# Patient Record
Sex: FEMALE | Race: WHITE | Employment: PART TIME | URBAN - METROPOLITAN AREA
[De-identification: names, ages, dates, MRNs, and addresses within clinical notes are randomized per-mention and may not be internally consistent; named-entity substitution may affect disease eponyms.]

---

## 2018-11-20 ENCOUNTER — OFFICE VISIT (OUTPATIENT)
Dept: FAMILY MEDICINE CLINIC | Age: 53
End: 2018-11-20

## 2018-11-20 VITALS
SYSTOLIC BLOOD PRESSURE: 117 MMHG | WEIGHT: 157 LBS | HEART RATE: 70 BPM | RESPIRATION RATE: 14 BRPM | HEIGHT: 65 IN | BODY MASS INDEX: 26.16 KG/M2 | DIASTOLIC BLOOD PRESSURE: 63 MMHG | OXYGEN SATURATION: 95 % | TEMPERATURE: 98.4 F

## 2018-11-20 DIAGNOSIS — R25.2 LEG CRAMPS: ICD-10-CM

## 2018-11-20 DIAGNOSIS — M25.551 RIGHT HIP PAIN: Primary | ICD-10-CM

## 2018-11-20 RX ORDER — METHIMAZOLE 10 MG/1
TABLET ORAL
Qty: 90 TAB | Refills: 12
Start: 2018-11-20 | End: 2021-01-25 | Stop reason: ALTCHOICE

## 2018-11-20 NOTE — PROGRESS NOTES
HISTORY OF PRESENT ILLNESS Dre Newton is a 47 y.o. female. HPI In for physical. Has been having R hip pain, worse at night, for the last 5-6 months. Not that painful when walks, except for the first few steps. Not taking any meds for it. The last week or 2, has been doing better. Pain seems to be in R groin area. Also has a bump on back that wants to have checked. Has also had leg cramps at night for the last few months. Review of Systems Constitutional: Positive for weight loss. Has been dieting. Respiratory: Negative for cough and shortness of breath. Cardiovascular: Negative for chest pain and orthopnea. Not exercising that much. Gastrointestinal: Negative for abdominal pain and blood in stool. Genitourinary: Negative for dysuria and hematuria. Musculoskeletal:  
     R hip pain Skin: Negative for itching and rash. Neurological: Negative for focal weakness and loss of consciousness. Endo/Heme/Allergies: Negative for polydipsia. Does not bruise/bleed easily. Psychiatric/Behavioral: Negative for depression. The patient does not have insomnia. Physical Exam  
Constitutional: She appears well-developed and well-nourished. HENT:  
Right Ear: External ear normal.  
Left Ear: External ear normal.  
Mouth/Throat: Oropharynx is clear and moist.  
Neck: No thyromegaly present. Cardiovascular: Normal rate, regular rhythm, normal heart sounds and intact distal pulses. Pulmonary/Chest: Breath sounds normal. She has no wheezes. Abdominal: Soft. Bowel sounds are normal. She exhibits no distension and no mass. There is no tenderness. Musculoskeletal: She exhibits no edema. R hip- full rom , no tenderness Lymphadenopathy:  
  She has no cervical adenopathy. Nursing note and vitals reviewed. ASSESSMENT and PLAN Orders Placed This Encounter  XR HIP RT W OR WO PELV 2-3 VWS Standing Status:   Future Standing Expiration Date:   2/20/2019 Order Specific Question:   Reason for Exam  
  Answer:   r hip pain Order Specific Question:   Is Patient Allergic to Contrast Dye? Answer:   No  
 MAGNESIUM  
 methIMAzole (TAPAZOLE) 10 mg tablet Si/2 tab po 3 days a week. Dispense:  90 Tab Refill:  12 Orders Placed This Encounter  XR HIP RT W OR WO PELV 2-3 VWS  
 MAGNESIUM  
 methIMAzole (TAPAZOLE) 10 mg tablet Diagnoses and all orders for this visit: 1. Right hip pain -     XR HIP RT W OR WO PELV 2-3 VWS; Future 2. Leg cramps -     MAGNESIUM Other orders 
-     methIMAzole (TAPAZOLE) 10 mg tablet; 1/2 tab po 3 days a week.

## 2018-11-20 NOTE — PROGRESS NOTES
Chief Complaint Patient presents with  Complete Physical  
 
1. Have you been to the ER, urgent care clinic since your last visit? Hospitalized since your last visit? No 
 
 
2. Have you seen or consulted any other health care providers outside of the [facility] since your last visit? Include any pap smears or colon screening. No  
 
Health Maintenance Due Topic Date Due  
 PAP AKA CERVICAL CYTOLOGY  11/15/1987  Shingrix Vaccine Age 50> (1 of 2) 11/15/2016  BREAST CANCER SCRN MAMMOGRAM  11/15/2016  FOBT Q 1 YEAR AGE 50-75  11/15/2016  Influenza Age 5 to Adult  08/01/2018

## 2018-11-21 LAB — MAGNESIUM SERPL-MCNC: 2 MG/DL (ref 1.6–2.3)

## 2019-06-07 LAB — HBA1C MFR BLD HPLC: 5.4 %

## 2019-11-13 LAB — HBA1C MFR BLD HPLC: 5.3 %

## 2020-05-14 LAB — HBA1C MFR BLD HPLC: 5.5 %

## 2021-01-22 ENCOUNTER — TELEPHONE (OUTPATIENT)
Dept: FAMILY MEDICINE CLINIC | Age: 56
End: 2021-01-22

## 2021-01-22 NOTE — TELEPHONE ENCOUNTER
Patient has a question on a covid vaccine questionnaire. She is having the vaccine next week, and does not know how to answer question. Please call @[phone].

## 2021-01-22 NOTE — TELEPHONE ENCOUNTER
Patient has questions as to whether she should get Covid Vaccine. Made her an appointment as we have not seen patient in 2 years.

## 2021-01-25 ENCOUNTER — OFFICE VISIT (OUTPATIENT)
Dept: FAMILY MEDICINE CLINIC | Age: 56
End: 2021-01-25
Payer: COMMERCIAL

## 2021-01-25 VITALS
TEMPERATURE: 96.9 F | HEART RATE: 77 BPM | OXYGEN SATURATION: 100 % | BODY MASS INDEX: 25.56 KG/M2 | SYSTOLIC BLOOD PRESSURE: 122 MMHG | RESPIRATION RATE: 16 BRPM | DIASTOLIC BLOOD PRESSURE: 77 MMHG | WEIGHT: 153.4 LBS | HEIGHT: 65 IN

## 2021-01-25 DIAGNOSIS — F90.0 ATTENTION DEFICIT HYPERACTIVITY DISORDER (ADHD), PREDOMINANTLY INATTENTIVE TYPE: ICD-10-CM

## 2021-01-25 DIAGNOSIS — Z12.11 COLON CANCER SCREENING: Primary | ICD-10-CM

## 2021-01-25 PROCEDURE — 99213 OFFICE O/P EST LOW 20 MIN: CPT | Performed by: FAMILY MEDICINE

## 2021-01-25 RX ORDER — DEXTROAMPHETAMINE SACCHARATE, AMPHETAMINE ASPARTATE MONOHYDRATE, DEXTROAMPHETAMINE SULFATE AND AMPHETAMINE SULFATE 2.5; 2.5; 2.5; 2.5 MG/1; MG/1; MG/1; MG/1
10 CAPSULE, EXTENDED RELEASE ORAL
Qty: 30 CAP | Refills: 0 | Status: SHIPPED | OUTPATIENT
Start: 2021-01-25 | End: 2021-03-02 | Stop reason: SDUPTHER

## 2021-01-25 NOTE — PROGRESS NOTES
Chief Complaint   Patient presents with    Medication Refill     1. Have you been to the ER, urgent care clinic since your last visit? Hospitalized since your last visit? No    2. Have you seen or consulted any other health care providers outside of the [address] [place] [address] since your last visit? Include any pap smears or colon screening. endocrinology     Patient here for follow up and form for covid vaccine.

## 2021-01-25 NOTE — PROGRESS NOTES
HISTORY OF PRESENT ILLNESS  Francisco Jamil is a 48 y.o. female. HPI In for checkup. Says that has been losing her focus on things, daughter has ADD. Pt somewhat opposed to meds. thinks that has had it all her life. Says that isnt getting tasks completed. Needs mammogram, pt wants to schedule her own appt. Sees gyn for paps tests. Declines flu shots. ROS    Physical Exam  Vitals signs and nursing note reviewed. Constitutional:       Appearance: She is well-developed. HENT:      Right Ear: External ear normal.      Left Ear: External ear normal.   Neck:      Thyroid: No thyromegaly. Cardiovascular:      Rate and Rhythm: Normal rate and regular rhythm. Heart sounds: Normal heart sounds. Pulmonary:      Breath sounds: Normal breath sounds. No wheezing. Abdominal:      General: Bowel sounds are normal. There is no distension. Palpations: Abdomen is soft. There is no mass. Tenderness: There is no abdominal tenderness. Lymphadenopathy:      Cervical: No cervical adenopathy. ASSESSMENT and PLAN  Orders Placed This Encounter    COLOGUARD TEST (FECAL DNA COLORECTAL CANCER SCREENING)    amphetamine-dextroamphetamine XR (ADDERALL XR) 10 mg XR capsule     Diagnoses and all orders for this visit:    1. Colon cancer screening  -     COLOGUARD TEST (FECAL DNA COLORECTAL CANCER SCREENING)    2. Attention deficit hyperactivity disorder (ADHD), predominantly inattentive type  -     amphetamine-dextroamphetamine XR (ADDERALL XR) 10 mg XR capsule; Take 1 Cap by mouth every morning. Max Daily Amount: 10 mg. Follow-up and Dispositions    · Return in about 3 months (around 4/25/2021).

## 2021-02-04 ENCOUNTER — TELEPHONE (OUTPATIENT)
Dept: FAMILY MEDICINE CLINIC | Age: 56
End: 2021-02-04

## 2021-02-04 NOTE — TELEPHONE ENCOUNTER
Patient is calling regarding a medication. She is supposed to call weekly. rxamphetamine-dextroamphetamine XR (ADDERALL XR) 10 mg XR capsule. Please call @[phone].   She is available after 2:30 p.m.      :

## 2021-02-04 NOTE — TELEPHONE ENCOUNTER
Patient advised to call 2-3 days before medication refill is needed, she states it is helping with focus issues

## 2021-03-02 DIAGNOSIS — F90.0 ATTENTION DEFICIT HYPERACTIVITY DISORDER (ADHD), PREDOMINANTLY INATTENTIVE TYPE: ICD-10-CM

## 2021-03-02 RX ORDER — DEXTROAMPHETAMINE SACCHARATE, AMPHETAMINE ASPARTATE MONOHYDRATE, DEXTROAMPHETAMINE SULFATE AND AMPHETAMINE SULFATE 2.5; 2.5; 2.5; 2.5 MG/1; MG/1; MG/1; MG/1
10 CAPSULE, EXTENDED RELEASE ORAL
Qty: 30 CAP | Refills: 0 | Status: SHIPPED | OUTPATIENT
Start: 2021-03-02 | End: 2021-04-13 | Stop reason: SDUPTHER

## 2021-03-02 NOTE — TELEPHONE ENCOUNTER
Patient wants to get the medication amphetamine-dextroamphetamine XR (ADDERALL XR) 10 mg XR capsule.   If any questions please give her a call @ [phone]

## 2021-03-05 LAB — COLOGUARD TEST, EXTERNAL: NEGATIVE

## 2021-04-13 DIAGNOSIS — F90.0 ATTENTION DEFICIT HYPERACTIVITY DISORDER (ADHD), PREDOMINANTLY INATTENTIVE TYPE: ICD-10-CM

## 2021-04-13 RX ORDER — DEXTROAMPHETAMINE SACCHARATE, AMPHETAMINE ASPARTATE MONOHYDRATE, DEXTROAMPHETAMINE SULFATE AND AMPHETAMINE SULFATE 2.5; 2.5; 2.5; 2.5 MG/1; MG/1; MG/1; MG/1
10 CAPSULE, EXTENDED RELEASE ORAL
Qty: 30 CAP | Refills: 0 | Status: SHIPPED | OUTPATIENT
Start: 2021-04-13 | End: 2021-06-01 | Stop reason: SDUPTHER

## 2021-04-13 NOTE — TELEPHONE ENCOUNTER
Patient is requesting a RX refill     amphetamine-dextroamphetamine XR (ADDERALL XR) 10 mg XR capsule she can be reached @ [phone]

## 2021-04-26 ENCOUNTER — OFFICE VISIT (OUTPATIENT)
Dept: FAMILY MEDICINE CLINIC | Age: 56
End: 2021-04-26
Payer: COMMERCIAL

## 2021-04-26 VITALS
BODY MASS INDEX: 25.49 KG/M2 | HEIGHT: 65 IN | HEART RATE: 67 BPM | RESPIRATION RATE: 16 BRPM | WEIGHT: 153 LBS | SYSTOLIC BLOOD PRESSURE: 113 MMHG | OXYGEN SATURATION: 100 % | DIASTOLIC BLOOD PRESSURE: 62 MMHG | TEMPERATURE: 97.1 F

## 2021-04-26 DIAGNOSIS — F90.2 ATTENTION DEFICIT HYPERACTIVITY DISORDER (ADHD), COMBINED TYPE: Primary | ICD-10-CM

## 2021-04-26 PROCEDURE — 99213 OFFICE O/P EST LOW 20 MIN: CPT | Performed by: FAMILY MEDICINE

## 2021-04-26 NOTE — PROGRESS NOTES
HISTORY OF PRESENT ILLNESS  Melisa Stewart is a 48 y.o. female. HPI in for followup of adl. Has been taking adderall about 4 days a week, the days she goes in for work. Some mild insomnia. SEes Dr. Rena Santo for hyperthyroidism, is off all meds     ROS    Physical Exam  Vitals signs and nursing note reviewed. Constitutional:       Appearance: She is well-developed. HENT:      Right Ear: External ear normal.      Left Ear: External ear normal.   Neck:      Thyroid: No thyromegaly. Cardiovascular:      Rate and Rhythm: Normal rate and regular rhythm. Heart sounds: Normal heart sounds. Pulmonary:      Breath sounds: Normal breath sounds. No wheezing. Abdominal:      General: Bowel sounds are normal. There is no distension. Palpations: Abdomen is soft. There is no mass. Tenderness: There is no abdominal tenderness. Lymphadenopathy:      Cervical: No cervical adenopathy. ASSESSMENT and PLAN  No orders of the defined types were placed in this encounter. Diagnoses and all orders for this visit:    1. Attention deficit hyperactivity disorder (ADHD), combined type      Follow-up and Dispositions    · Return in about 6 months (around 10/26/2021).

## 2021-04-26 NOTE — LETTER
CONTROLLED SUBSTANCE MEDICATION AGREEMENT Patient Name: Kat Livingston Patient YOB: 1965 I understand, that controlled substance medications may be used to help better manage my symptoms and to improve my ability to function at home, work and in social settings. However, I also understand that these medications do have risks, which have been discussed with me, including possible development of physical or psychological dependence. I understand that successful treatment requires mutual trust and honesty between me and my provider. I understand and agree that following this Medication Agreement is necessary in continuing my provider-patient relationship and the success of my treatment plan. Explanation from my Provider: Benefits and Goals of Controlled Substance Medications: There are two potential goals for your treatment: (1) decreased pain and suffering (2) improved daily life functions. There are many possible treatments for your chronic condition(s). Alternatives such as physical therapy, yoga, massage, home daily exercise, meditation, relaxation techniques, injections, chiropractic manipulations, surgery, cognitive therapy, hypnosis and many medications that are not habit-forming may be used. Use of controlled substance medications may be helpful, but they are unlikely to resolve all symptoms or restore all function. Explanation from my Provider: Risks of Controlled Substance Medications: 
 Opioid pain medications: These medications can lead to problems such as addiction/dependence, sedation, lightheadedness/dizziness, memory issues, falls, constipation, nausea, or vomiting. They may also impair the ability to drive or operate machinery. Additionally, these medications may lower testosterone levels, leading to loss of bone strength, stamina and sex drive.   They may cause problems with breathing, sleep apnea and reduced coughing, which is especially dangerous for patients with lung disease. Overdose or dangerous interactions with alcohol and other medications may occur, leading to death. Hyperalgesia may develop, which means patients receiving opioids for the treatment of pain may become more sensitive to certain painful stimuli, and in some cases, experience pain from ordinarily non-painful stimuli. Women between the ages of 14-53 who could become pregnant should carefully weigh the risks and benefits of opioids with their physicians, as these medications increase the risk of pregnancy complications, including miscarriage,  delivery and stillbirth. It is also possible for babies to be born addicted to opioids. Opioid dependence withdrawal symptoms may include; feelings of uneasiness, increased pain, irritability, belly pain, diarrhea, sweats and goose-flesh. Testosterone replacement therapy:  Potential side effects include increased risk of stroke and heart attack, blood clots, increased blood pressure, increased cholesterol, enlarged prostate, sleep apnea, irritability/aggression and other mood disorders, and decreased fertility. Vitaliy Ballesteros (1965)             Page 1 of 4    Initials:_______ Benzodiazepines and non-benzodiazepine sleep medications: These medications can lead to problems such as addiction/dependence, sedation, fatigue, lightheadedness, dizziness, incoordination, falls, depression, hallucinations, and impaired judgment, memory and concentration. The ability to drive and operate machinery may also be affected. Abnormal sleep-related behaviors have been reported, including sleepwalking, driving, making telephone calls, eating, or having sex while not fully awake. These medications can suppress breathing and worsen sleep apnea, particularly when combined with alcohol or other sedating medications, potentially leading to death. Dependence withdrawal symptoms may include tremors, anxiety, hallucinations and seizures.  
 Stimulants:  Common adverse effects include addiction/dependence, increased blood pressure and heart rate, decreased appetite, nausea, involuntary weight loss, insomnia,  irritability, and headaches. These risks may increase when these medications are combined with other stimulants, such as caffeine pills or energy drinks, certain weight loss supplements and oral decongestants. Dependence withdrawal symptoms may include depressed mood, loss of interest, suicidal thoughts, anxiety, fatigue, appetite changes and agitation. I agree and understand that I and my prescriber have the following rights and responsibilities regarding my treatment plan:  
1. MY RIGHTS: 
To be informed of my treatment and medication plan. To be an active participant in my health and wellbeing. 2. MY RESPONSIBILITY AND UNDERSTANDING FOR USE OF MEDICATIONS  I will take medications at the dose and frequency as directed. For my safety, I will not increase or change how I take my medications without the recommendation of my healthcare provider.  I will actively participate in any program recommended by my provider which may improve function, including social, physical, psychological programs.  I will not take my medications with alcohol or other drugs not prescribed to me. I understand that drinking alcohol with my medications increases the chances of side effects, including reduced breathing rate and could lead to personal injury when operating machinery.  I understand that if I have a history of substance use disorders, including alcohol or other illicit drugs, that I may be at increased risk of addiction to my medications.  I agree to notify my provider immediately if I should become pregnant so that my treatment plan can be adjusted.  
 I agree and understand that I shall only receive controlled substance medications from the prescriber that signed this agreement unless there is written agreement among other prescribers of controlled substances outlining the responsibility of the medications being prescribed.  I understand that the if the controlled medication is not helping to achieve goals, the dosage may be tapered and no longer prescribed. 3. MY RESPONSIBILITY FOR COMMUNICATION / PRESCRIPTION RENEWALS 
 I agree that all controlled substance medications that I take will be prescribed only by my provider. If another healthcare provider prescribes me medication in an emergency, I will notify my provider within seventy-two (72) hours. Carissa Rosales (1965)             Page 2 of 4    Initials:_______  I will arrange for refills at the prescribed interval ONLY during regular office hours. I will not ask for refills earlier than agreed, after-hours, on holidays or weekends. Refills may take up to 72 hours for processing and prescriptions to reach the pharmacy.  I will inform my other health care providers that I am taking these medications and of the existence of this [id number]. In the event of an emergency, I will provide the same information to the emergency department prescribers.  I will keep my provider updated on the pharmacy I am using for controlled medication prescription filling. 4. MY RESPONSIBILITY FOR PROTECTING MEDICATIONS  I will protect my prescriptions and medications. I understand that lost or misplaced prescriptions will not be replaced.  I will keep medications only for my own use and will not share them with others. I will keep all medications away from children.  I agree that if my medications are adjusted or discontinued, I will properly dispose of any remaining medications. I understand that I will be required to dispose of any remaining controlled medications as, directed by my prescriber, prior to being provided with any prescriptions for other controlled medications.   Medication drop box locations can be found at: [employer] 5. MY RESPONSIBILITY WITH ILLEGAL DRUGS  I will not use illegal or street drugs or another person's prescription medications not prescribed to me.  If there are identified addiction type symptoms, then referral to a program may be provided by my provider and I agree to follow through with this recommendation. 6. MY RESPONSIBILITY FOR COOPERATION WITH INVESTIGATIONS  I understand that my provider will comply with any applicable law and may discuss my use and/or possible misuse/abuse of controlled substances and alcohol, as appropriate, with any health care provider involved in my care, pharmacist, or legal authority.  I authorize my provider and pharmacy to cooperate fully with law enforcement agencies (as permitted by law) in the investigation of any possible misuse, sale, or other diversion of my controlled substances.  I agree to waive any applicable privilege or right of privacy or confidentiality with respect to these authorizations. 7. PROVIDERS RIGHT TO MONITOR FOR SAFETY: PRESCRIPTION MONITORING / DRUG TESTING 
 I consent to drug/toxicology screening and will submit to a drug screen upon my providers request to assure I am only taking the prescribed drugs for my safety monitoring. I understand that a drug screen is a laboratory test in which a sample of my urine, blood or saliva is checked to see what drugs I have been taking. This may entail an observed urine specimen, which means that a nurse or other health care provider may watch me provide urine, and I will cooperate if I am asked to provide an observed specimen. Cheng Botello (1965)             Page 3 of 4    Initials:_______  I understand that my provider will check a copy of my State Prescription Monitoring Program () Report in order to safely prescribe medications.    
 Pill Counts: I consent to pill counts when requested. I may be asked to bring all my prescribed controlled substance medications, in their original bottles, to all of my scheduled appointments. In addition, my provider may ask me to come to the practice at any time for a random pill count. 8. TERMINATION OF THIS AGREEMENT  For my safety, my prescriber has the right to stop prescribing controlled substance medications and may end this agreement.  Conditions that may result in termination of this agreement: 
a. I do not show any improvement in pain, or my activity has not improved. b. I develop rapid tolerance or loss of improvement, as described in my treatment plan. 
c. I develop significant side effects from the medication. d. My behavior is not consistent with the responsibilities outlined above, thereby causing safety concerns to continue prescribing controlled substance medications. e. I fail to follow the terms of this agreement. f. Other:____________________________ UNDERSTANDING THIS MEDICATION AGREEMENT:   
I have read the above and have had all my questions answered. For chronic disease management, I know that my symptoms can be managed with many types of treatments. A chronic medication trial may be part of my treatment, but I must be an active participant in my care. Medication therapy is only one part of my symptom management plan. In some cases, there may be limited scientific evidence to support the chronic use of certain medications to improve symptoms and daily function. Furthermore, in certain circumstances, there may be scientific information that suggests that the use of chronic controlled substances may worsen my symptoms and increase my risk of unintentional death directly related to this medication therapy.   I know that if my provider feels my risk from controlled medications is greater than my benefit, I will have my controlled substance medication(s) compassionately lowered or removed altogether. I further agree to allow this office to contact my HIPAA contact if there are concerns about my safety and use of the controlled medications. I have agreed to use the prescribed controlled substance medications to me as instructed by my provider and as stated in this Medication Agreement. My initial on each page and my signature below shows that I have read each page and I have had the opportunity to ask questions with answers provided by my provider. Patient Name (Printed): _____________________________________ Patient Signature:  ______________________   Date: _____________ Prescriber Name (Printed): ___________________________________ Prescriber Signature: _____________________  Date: _____________ Al Masters (1965)             Page 4 of 4

## 2021-04-26 NOTE — PROGRESS NOTES
Chief Complaint   Patient presents with    Follow-up    Medication Refill     1. Have you been to the ER, urgent care clinic since your last visit? Hospitalized since your last visit? No    2. Have you seen or consulted any other health care providers outside of the [address] [place] [address] since your last visit? Include any pap smears or colon screening.  No

## 2021-05-06 LAB — HBA1C MFR BLD HPLC: 5.5 %

## 2021-06-01 DIAGNOSIS — F90.0 ATTENTION DEFICIT HYPERACTIVITY DISORDER (ADHD), PREDOMINANTLY INATTENTIVE TYPE: ICD-10-CM

## 2021-06-01 RX ORDER — DEXTROAMPHETAMINE SACCHARATE, AMPHETAMINE ASPARTATE MONOHYDRATE, DEXTROAMPHETAMINE SULFATE AND AMPHETAMINE SULFATE 2.5; 2.5; 2.5; 2.5 MG/1; MG/1; MG/1; MG/1
10 CAPSULE, EXTENDED RELEASE ORAL
Qty: 30 CAPSULE | Refills: 0 | Status: SHIPPED | OUTPATIENT
Start: 2021-06-01 | End: 2021-10-26 | Stop reason: DRUGHIGH

## 2021-06-01 NOTE — TELEPHONE ENCOUNTER
Patient is calling, regarding a refill on her:amphetamine-dextroamphetamine XR (ADDERALL XR) 10 mg XR capsule. Please call @[phone].

## 2021-06-01 NOTE — TELEPHONE ENCOUNTER
Last OV: 4/26/21  Next Appt: 10/26/21  Last Refill: 4/13/21    Requested Prescriptions     Pending Prescriptions Disp Refills    amphetamine-dextroamphetamine XR (ADDERALL XR) 10 mg XR capsule 30 Capsule 0     Sig: Take 1 Capsule by mouth every morning. Max Daily Amount: 10 mg.     LM with pt that refill request received and sent to PCP for signature.

## 2021-10-26 ENCOUNTER — OFFICE VISIT (OUTPATIENT)
Dept: FAMILY MEDICINE CLINIC | Age: 56
End: 2021-10-26
Payer: COMMERCIAL

## 2021-10-26 VITALS
HEIGHT: 65 IN | OXYGEN SATURATION: 98 % | DIASTOLIC BLOOD PRESSURE: 63 MMHG | SYSTOLIC BLOOD PRESSURE: 126 MMHG | BODY MASS INDEX: 24.99 KG/M2 | HEART RATE: 71 BPM | TEMPERATURE: 97.5 F | RESPIRATION RATE: 16 BRPM | WEIGHT: 150 LBS

## 2021-10-26 DIAGNOSIS — F90.0 ATTENTION DEFICIT HYPERACTIVITY DISORDER (ADHD), PREDOMINANTLY INATTENTIVE TYPE: Primary | ICD-10-CM

## 2021-10-26 PROCEDURE — 99213 OFFICE O/P EST LOW 20 MIN: CPT | Performed by: FAMILY MEDICINE

## 2021-10-26 RX ORDER — DEXTROAMPHETAMINE SACCHARATE, AMPHETAMINE ASPARTATE MONOHYDRATE, DEXTROAMPHETAMINE SULFATE AND AMPHETAMINE SULFATE 3.75; 3.75; 3.75; 3.75 MG/1; MG/1; MG/1; MG/1
15 CAPSULE, EXTENDED RELEASE ORAL
Qty: 30 CAPSULE | Refills: 0 | Status: SHIPPED | OUTPATIENT
Start: 2021-10-26 | End: 2022-03-14 | Stop reason: ALTCHOICE

## 2021-10-26 NOTE — PROGRESS NOTES
Chief Complaint   Patient presents with    Thyroid Problem    Follow-up     1. Have you been to the ER, urgent care clinic since your last visit? Hospitalized since your last visit? No    2. Have you seen or consulted any other health care providers outside of the [address] [place] [address] since your last visit? Include any pap smears or colon screening.  Yes Endocrinology

## 2021-10-26 NOTE — PROGRESS NOTES
HISTORY OF PRESENT ILLNESS  Mainor Officer is a 48 y.o. female. HPIHas been sitting in virtual meetings at work, wasn't focusing on things. Tried adderall 10 mg- thinks that it helped her focus somewhat. Pt not sure whether to not take it or to take a higher dose. Pt works as a teacher. Says that focus was doing better when was back in school, but now is starting to drift. ROS    Physical Exam  Vitals and nursing note reviewed. Constitutional:       Appearance: She is well-developed. HENT:      Right Ear: External ear normal.      Left Ear: External ear normal.   Neck:      Thyroid: No thyromegaly. Cardiovascular:      Rate and Rhythm: Normal rate and regular rhythm. Heart sounds: Normal heart sounds. Pulmonary:      Breath sounds: Normal breath sounds. No wheezing. Abdominal:      General: Bowel sounds are normal. There is no distension. Palpations: Abdomen is soft. There is no mass. Tenderness: There is no abdominal tenderness. Lymphadenopathy:      Cervical: No cervical adenopathy. ASSESSMENT and PLAN  Orders Placed This Encounter    amphetamine-dextroamphetamine XR (ADDERALL XR) 15 mg XR capsule     Diagnoses and all orders for this visit:    1. Attention deficit hyperactivity disorder (ADHD), predominantly inattentive type  -     amphetamine-dextroamphetamine XR (ADDERALL XR) 15 mg XR capsule; Take 1 Capsule by mouth every morning. Max Daily Amount: 15 mg.

## 2022-01-18 LAB
CREATININE, EXTERNAL: 0.6
HBA1C MFR BLD HPLC: 5.7 %
LDL-C, EXTERNAL: 197

## 2022-02-07 ENCOUNTER — TELEPHONE (OUTPATIENT)
Dept: FAMILY MEDICINE CLINIC | Age: 57
End: 2022-02-07

## 2022-02-07 DIAGNOSIS — F90.0 ATTENTION DEFICIT HYPERACTIVITY DISORDER (ADHD), PREDOMINANTLY INATTENTIVE TYPE: ICD-10-CM

## 2022-02-07 RX ORDER — DEXTROAMPHETAMINE SACCHARATE, AMPHETAMINE ASPARTATE MONOHYDRATE, DEXTROAMPHETAMINE SULFATE AND AMPHETAMINE SULFATE 3.75; 3.75; 3.75; 3.75 MG/1; MG/1; MG/1; MG/1
15 CAPSULE, EXTENDED RELEASE ORAL
Qty: 30 CAPSULE | Refills: 0 | Status: CANCELLED | OUTPATIENT
Start: 2022-02-07

## 2022-02-07 NOTE — TELEPHONE ENCOUNTER
----- Message from Olivia Moreira sent at 2/7/2022  2:48 PM EST -----  Subject: Refill Request    QUESTIONS  Name of Medication? amphetamine-dextroamphetamine XR (ADDERALL XR) 15 mg   XR capsule  Patient-reported dosage and instructions? as needed  How many days do you have left? 0  Preferred Pharmacy? [id number]  Pharmacy phone number (if available)? [phone]  Additional Information for Provider? Requesting if the non extended   release can be sent in, she is advising the extended release seems to   \"keep her up at night. \"  ---------------------------------------------------------------------------  --------------  CALL BACK INFO  What is the best way for the office to contact you? OK to leave message on   voicemail  Preferred Call Back Phone Number?  [phone]

## 2022-02-16 ENCOUNTER — TELEPHONE (OUTPATIENT)
Dept: FAMILY MEDICINE CLINIC | Age: 57
End: 2022-02-16

## 2022-02-16 NOTE — TELEPHONE ENCOUNTER
----- Message from Dulce Moser sent at 2/16/2022 10:01 AM EST -----  Subject: Appointment Request    Reason for Call: Routine Existing Condition Follow Up    QUESTIONS  Type of Appointment? Established Patient  Reason for appointment request? Available appointments did not meet   patient need  Additional Information for Provider? PAtient needs a call back to get   scheduled   ---------------------------------------------------------------------------  --------------  CALL BACK INFO  What is the best way for the office to contact you? OK to leave message on   voicemail  Preferred Call Back Phone Number? [phone]  ---------------------------------------------------------------------------  --------------  SCRIPT ANSWERS  Relationship to Patient? Self  Is this follow up request related to routine Diabetes Management? No  Have you been diagnosed with, awaiting test results for, or told that you   are suspected of having COVID-19 (Coronavirus)? (If patient has tested   negative or was tested as a requirement for work, school, or travel and   not based on symptoms, answer no)? No  Within the past 10 days have you developed any of the following symptoms   (answer no if symptoms have been present longer than 10 days or began   more than 10 days ago)? Fever or Chills, Cough, Shortness of breath or   difficulty breathing, Loss of taste or smell, Sore throat, Nasal   congestion, Sneezing or runny nose, Fatigue or generalized body aches   (answer no if pain is specific to a body part e.g. back pain), Diarrhea,   Headache? No  Have you had close contact with someone with COVID-19 in the last 7 days? No  (Service Expert  click yes below to proceed with [employer] As Usual   Scheduling)?  Yes

## 2022-03-14 ENCOUNTER — OFFICE VISIT (OUTPATIENT)
Dept: FAMILY MEDICINE CLINIC | Age: 57
End: 2022-03-14
Payer: COMMERCIAL

## 2022-03-14 VITALS
SYSTOLIC BLOOD PRESSURE: 114 MMHG | RESPIRATION RATE: 16 BRPM | TEMPERATURE: 97 F | WEIGHT: 148.6 LBS | HEART RATE: 70 BPM | OXYGEN SATURATION: 98 % | DIASTOLIC BLOOD PRESSURE: 68 MMHG | BODY MASS INDEX: 24.76 KG/M2 | HEIGHT: 65 IN

## 2022-03-14 DIAGNOSIS — F90.0 ATTENTION DEFICIT HYPERACTIVITY DISORDER (ADHD), PREDOMINANTLY INATTENTIVE TYPE: Primary | ICD-10-CM

## 2022-03-14 PROCEDURE — 99213 OFFICE O/P EST LOW 20 MIN: CPT | Performed by: FAMILY MEDICINE

## 2022-03-14 RX ORDER — TRIAMCINOLONE ACETONIDE 1 MG/G
CREAM TOPICAL 2 TIMES DAILY
Qty: 45 G | Refills: 2 | Status: SHIPPED | OUTPATIENT
Start: 2022-03-14

## 2022-03-14 RX ORDER — DEXTROAMPHETAMINE SACCHARATE, AMPHETAMINE ASPARTATE, DEXTROAMPHETAMINE SULFATE AND AMPHETAMINE SULFATE 3.75; 3.75; 3.75; 3.75 MG/1; MG/1; MG/1; MG/1
15 TABLET ORAL DAILY
Qty: 30 TABLET | Refills: 0 | Status: SHIPPED | OUTPATIENT
Start: 2022-03-14 | End: 2022-04-12

## 2022-03-14 NOTE — PROGRESS NOTES
HISTORY OF PRESENT ILLNESS  Chloe Benito is a 55 y.o. female. HPI in for followup. Says that has difficulty sleeping on the days that she takes her adderall xr. Otherwise doing fairly well. The  adderall does help her focus  At work    ROS    Physical Exam  Vitals and nursing note reviewed. Constitutional:       Appearance: She is well-developed. HENT:      Right Ear: External ear normal.      Left Ear: External ear normal.   Neck:      Thyroid: No thyromegaly. Cardiovascular:      Rate and Rhythm: Normal rate and regular rhythm. Heart sounds: Normal heart sounds. Pulmonary:      Breath sounds: Normal breath sounds. No wheezing. Abdominal:      General: Bowel sounds are normal. There is no distension. Palpations: Abdomen is soft. There is no mass. Tenderness: There is no abdominal tenderness. Lymphadenopathy:      Cervical: No cervical adenopathy. ASSESSMENT and PLAN  Orders Placed This Encounter    dextroamphetamine-amphetamine (ADDERALL) 15 mg tablet    triamcinolone acetonide (KENALOG) 0.1 % topical cream     Diagnoses and all orders for this visit:    1. Attention deficit hyperactivity disorder (ADHD), predominantly inattentive type  -     dextroamphetamine-amphetamine (ADDERALL) 15 mg tablet; Take 1 Tablet by mouth daily for 29 days. Max Daily Amount: 15 mg. Other orders  -     triamcinolone acetonide (KENALOG) 0.1 % topical cream; Apply  to affected area two (2) times a day. use thin layer          Will see if insomnia improves on the immediate release adderall.

## 2022-04-26 DIAGNOSIS — F90.0 ATTENTION DEFICIT HYPERACTIVITY DISORDER (ADHD), PREDOMINANTLY INATTENTIVE TYPE: Primary | ICD-10-CM

## 2022-04-26 RX ORDER — DEXTROAMPHETAMINE SACCHARATE, AMPHETAMINE ASPARTATE, DEXTROAMPHETAMINE SULFATE AND AMPHETAMINE SULFATE 3.75; 3.75; 3.75; 3.75 MG/1; MG/1; MG/1; MG/1
15 TABLET ORAL DAILY
Qty: 29 TABLET | Refills: 0 | Status: SHIPPED | OUTPATIENT
Start: 2022-04-26 | End: 2022-05-25

## 2022-04-26 NOTE — TELEPHONE ENCOUNTER
----- Message from Alberto Cabot sent at 4/26/2022  3:17 PM EDT -----  Subject: Refill Request    QUESTIONS  Name of Medication? dextroamphetamine-amphetamine (ADDERALL) 15 mg tablet  Patient-reported dosage and instructions? 15 MG tablet take as needed  How many days do you have left? 4  Preferred Pharmacy? [id number]  Pharmacy phone number (if available)? [phone]  ---------------------------------------------------------------------------  --------------  CALL BACK INFO  What is the best way for the office to contact you? OK to leave message on   voicemail  Preferred Call Back Phone Number? [phone]  ---------------------------------------------------------------------------  --------------  SCRIPT ANSWERS  Relationship to Patient?  Self

## 2022-07-06 DIAGNOSIS — F90.0 ATTENTION DEFICIT HYPERACTIVITY DISORDER (ADHD), PREDOMINANTLY INATTENTIVE TYPE: Primary | ICD-10-CM

## 2022-07-06 RX ORDER — DEXTROAMPHETAMINE SACCHARATE, AMPHETAMINE ASPARTATE, DEXTROAMPHETAMINE SULFATE AND AMPHETAMINE SULFATE 3.75; 3.75; 3.75; 3.75 MG/1; MG/1; MG/1; MG/1
15 TABLET ORAL DAILY
Qty: 30 TABLET | Refills: 0 | Status: SHIPPED | OUTPATIENT
Start: 2022-07-06 | End: 2022-09-14 | Stop reason: SDUPTHER

## 2022-07-06 RX ORDER — DEXTROAMPHETAMINE SACCHARATE, AMPHETAMINE ASPARTATE, DEXTROAMPHETAMINE SULFATE AND AMPHETAMINE SULFATE 3.75; 3.75; 3.75; 3.75 MG/1; MG/1; MG/1; MG/1
15 TABLET ORAL DAILY
COMMUNITY
End: 2022-07-06 | Stop reason: SDUPTHER

## 2022-07-06 NOTE — TELEPHONE ENCOUNTER
[phone] Pt would like to get a refill Dextroamp-amphetamin 15 mg    [facility] [address] [facility] [place].

## 2022-08-24 LAB — HBA1C MFR BLD HPLC: 5.7 %

## 2022-09-14 ENCOUNTER — OFFICE VISIT (OUTPATIENT)
Dept: FAMILY MEDICINE CLINIC | Age: 57
End: 2022-09-14
Payer: COMMERCIAL

## 2022-09-14 VITALS
WEIGHT: 152 LBS | HEIGHT: 65 IN | RESPIRATION RATE: 16 BRPM | TEMPERATURE: 98 F | OXYGEN SATURATION: 98 % | DIASTOLIC BLOOD PRESSURE: 72 MMHG | SYSTOLIC BLOOD PRESSURE: 119 MMHG | HEART RATE: 81 BPM | BODY MASS INDEX: 25.33 KG/M2

## 2022-09-14 DIAGNOSIS — F90.0 ATTENTION DEFICIT HYPERACTIVITY DISORDER (ADHD), PREDOMINANTLY INATTENTIVE TYPE: ICD-10-CM

## 2022-09-14 PROCEDURE — 99213 OFFICE O/P EST LOW 20 MIN: CPT | Performed by: FAMILY MEDICINE

## 2022-09-14 RX ORDER — DEXTROAMPHETAMINE SACCHARATE, AMPHETAMINE ASPARTATE, DEXTROAMPHETAMINE SULFATE AND AMPHETAMINE SULFATE 3.75; 3.75; 3.75; 3.75 MG/1; MG/1; MG/1; MG/1
15 TABLET ORAL DAILY
Qty: 30 TABLET | Refills: 0 | Status: SHIPPED | OUTPATIENT
Start: 2022-09-14

## 2022-09-14 NOTE — PROGRESS NOTES
HISTORY OF PRESENT ILLNESS  Sari Cantrell is a 55 y.o. female. HPI In for checkup. Has been off of adderall. Has been off this summer. Plans on starting back to school on Monday. Feels like the medicine helped her on school days. Pts children are 16, 22, 27, 28. 3  still at home. No longer has Graves disease. ROS    Physical Exam  Vitals and nursing note reviewed. Constitutional:       Appearance: She is well-developed. HENT:      Right Ear: External ear normal.      Left Ear: External ear normal.   Neck:      Thyroid: No thyromegaly. Cardiovascular:      Rate and Rhythm: Normal rate and regular rhythm. Heart sounds: Normal heart sounds. Pulmonary:      Breath sounds: Normal breath sounds. No wheezing. Abdominal:      General: Bowel sounds are normal. There is no distension. Palpations: Abdomen is soft. There is no mass. Tenderness: There is no abdominal tenderness. Lymphadenopathy:      Cervical: No cervical adenopathy. ASSESSMENT and PLAN  Orders Placed This Encounter    dextroamphetamine-amphetamine (ADDERALL) 15 mg tablet     Diagnoses and all orders for this visit:    1. Attention deficit hyperactivity disorder (ADHD), predominantly inattentive type  -     dextroamphetamine-amphetamine (ADDERALL) 15 mg tablet; Take 1 Tablet by mouth daily. Max Daily Amount: 15 mg.

## 2022-09-14 NOTE — PROGRESS NOTES
Chief Complaint   Patient presents with    Medication Refill       1. \"Have you been to the ER, urgent care clinic since your last visit? Hospitalized since your last visit? \" No    2. \"Have you seen or consulted any other health care providers outside of the [address] [place] [address] since your last visit? \" No     3. For patients aged 39-70: Has the patient had a colonoscopy / FIT/ Cologuard? Yes - no Care Gap present      If the patient is female:    4. For patients aged 41-77: Has the patient had a mammogram within the past 2 years? Yes - no Care Gap present      5. For patients aged 21-65: Has the patient had a pap smear?  Yes - no Care Gap present    Health Maintenance Due   Topic Date Due    Hepatitis C Screening  Never done    Shingrix Vaccine Age 49> (1 of 2) Never done    COVID-19 Vaccine (3 - Booster for Pfizer series) 07/25/2021    Flu Vaccine (1) 09/01/2022

## 2023-01-19 ENCOUNTER — VIRTUAL VISIT (OUTPATIENT)
Dept: FAMILY MEDICINE CLINIC | Age: 58
End: 2023-01-19
Payer: COMMERCIAL

## 2023-01-19 DIAGNOSIS — R50.9 FEVER AND CHILLS: Primary | ICD-10-CM

## 2023-01-19 DIAGNOSIS — B34.9 VIRAL ILLNESS: ICD-10-CM

## 2023-01-19 PROCEDURE — 99213 OFFICE O/P EST LOW 20 MIN: CPT | Performed by: FAMILY MEDICINE

## 2023-01-19 RX ORDER — IBUPROFEN 200 MG
200 TABLET ORAL
COMMUNITY

## 2023-01-19 RX ORDER — DIPHENHYDRAMINE CITRATE AND IBUPROFEN 38; 200 MG/1; MG/1
TABLET, COATED ORAL
COMMUNITY

## 2023-01-19 NOTE — PROGRESS NOTES
Chief Complaint   Patient presents with    Cold Symptoms     Chills, fever, no appetite, cough. Tested covid negative 3 times       1. Have you been to the ER, urgent care clinic since your last visit? Hospitalized since your last visit? No    2. Have you seen or consulted any other health care providers outside of the [address] [place] [address] since your last visit? Include any pap smears or colon screening.  No

## 2023-01-19 NOTE — PROGRESS NOTES
Consent: Beth Xiong, who was seen by synchronous (real-time) audio-video technology, and/or her healthcare decision maker, is aware that this patient-initiated, Telehealth encounter on 1/19/2023 is a billable service, with coverage as determined by her insurance carrier. She is aware that she may receive a bill and has provided verbal consent to proceed: Yes. Beth Xiong is a 65 y.o. female    Pt here for acute care     has a past medical history of Graves disease and Hyperthyroidism. 1 week duration, tired, started having fever 101.5 F recently 1 hr after takind advil, chills X 4 days. Denies nasal issues, HA, sore throat, ear aches, diarrhea, abdo pain, N/V, dysuria, rash. Denies SOB. A little coughing. Took 3 COVID test, last was today and negative  Clothes drench at night. Taking advil 200mg maybe twice a day. Discussed ibu up to 800mg tidprn   Tylenol 650mg tidprn    She works in school    We discussed options for testings. Discussed food, fluids    For now she wanted to wait it out. She may go get a test for UTI and let us know if positive. Reviewed: active problem list, medication list, allergies, notes from last encounter, lab results    A comprehensive review of systems was negative except for that written in the HPI. Assessment & Plan:   Diagnoses and all orders for this visit:    1. Fever and chills    2. Viral illness    Follow-up and Dispositions    Return in about 3 days (around 1/22/2023), or if symptoms worsen or fail to improve. Subjective:   Beth Xiong is a 65 y.o. female who was seen for Cold Symptoms (Chills, fever, no appetite, cough. Tested covid negative 3 times)      Prior to Admission medications    Medication Sig Start Date End Date Taking? Authorizing Provider   ibuprofen (AdviL) 200 mg tablet Take 200 mg by mouth every six (6) hours as needed for Pain.    Yes Provider, Historical   Ibuprofen-diphenhydrAMINE (Advil PM) 200-38 mg tab Take  by mouth. Yes Provider, Historical   dextroamphetamine-amphetamine (ADDERALL) 15 mg tablet Take 1 Tablet by mouth daily. Max Daily Amount: 15 mg. 9/14/22  Yes Holden Galdamez MD   triamcinolone acetonide (KENALOG) 0.1 % topical cream Apply  to affected area two (2) times a day. use thin layer  Patient not taking: No sig reported 3/14/22   Holden Galdamez MD     No Known Allergies      Objective:   Vital Signs: (As obtained by patient/caregiver at home)  There were no vitals taken for this visit. Constitutional: [x] Appears well-developed and well-nourished [x] No apparent distress        Mental status: [x] Alert and awake  [x] Oriented to person/place/time [x] Able to follow commands      Eyes:   EOM    [x]  Normal      Sclera  [x]  Normal              Discharge [x]  None visible       HENT: [x] Normocephalic, atraumatic    [] Mouth/Throat: Mucous membranes are moist    External Ears [x] Normal      Neck: [x] No visualized mass     Pulmonary/Chest: [x] Respiratory effort normal   [x] No visualized signs of difficulty breathing or respiratory distress           Musculoskeletal:   [x] Normal gait with no signs of ataxia         [x] Normal range of motion of neck         Neurological:        [x] No Facial Asymmetry (Cranial nerve 7 motor function) (limited exam due to video visit)          [x] No gaze palsy              Skin:        [x] No significant exanthematous lesions or discoloration noted on facial skin                  Psychiatric:       [x] Normal Affect [] Abnormal -        [x] No Hallucinations      We discussed the expected course, resolution and complications of the diagnosis(es) in detail. Medication risks, benefits, costs, interactions, and alternatives were discussed as indicated. I advised her to contact the office if her condition worsens, changes or fails to improve as anticipated. She expressed understanding with the diagnosis(es) and plan. Patty Primrose is a 65 y.o. female being evaluated by a video visit encounter for concerns as above. A caregiver was present when appropriate. Due to this being a TeleHealth encounter (During SJWTW-46 public health emergency), evaluation of the following organ systems was limited: Vitals/Constitutional/EENT/Resp/CV/GI//MS/Neuro/Skin/Heme-Lymph-Imm. Pursuant to the emergency declaration under the [address] [place] [address], [facility] waiver authority and the [employer] Act, this Virtual  Visit was conducted, with patient's (and/or legal guardian's) consent, to reduce the patient's risk of exposure to COVID-19 and provide necessary medical care. Services were provided through a video synchronous discussion virtually to substitute for in-person clinic visit. Patient and provider were located at their individual homes.         Hermilo Shabazz MD

## 2023-01-20 ENCOUNTER — TELEPHONE (OUTPATIENT)
Dept: FAMILY MEDICINE CLINIC | Age: 58
End: 2023-01-20

## 2023-01-20 NOTE — TELEPHONE ENCOUNTER
Patient would like a call from Nain Robbins regarding her UTI test is positive she can be reached @ [phone]

## 2023-01-20 NOTE — TELEPHONE ENCOUNTER
Spoke with patient. She did a home UTI test today. It was positive. She said that Dr. Demian Roman told her to do test and let him know results. Explained he has left for today. Will return on Monday. Instructed her to go to UC if symptoms get worse over weekend. Patient verbalized understanding.

## 2023-01-23 NOTE — TELEPHONE ENCOUNTER
MD Rachel Levin, LPN  Caller: Unspecified (3 days ago,  2:36 PM)  Done, called pt, updated her chart note to reflect it.      Nicola Nguyen MD   1/23/2023

## 2023-02-01 ENCOUNTER — TELEPHONE (OUTPATIENT)
Dept: FAMILY MEDICINE CLINIC | Age: 58
End: 2023-02-01

## 2023-02-01 NOTE — TELEPHONE ENCOUNTER
Patient states the home strip UTI test is still coming up positive even after taking medication. She requested a call back from the nurse to discuss what can be done. She can be reached at [phone].

## 2023-02-03 ENCOUNTER — OFFICE VISIT (OUTPATIENT)
Dept: FAMILY MEDICINE CLINIC | Age: 58
End: 2023-02-03
Payer: COMMERCIAL

## 2023-02-03 DIAGNOSIS — F90.0 ATTENTION DEFICIT HYPERACTIVITY DISORDER (ADHD), PREDOMINANTLY INATTENTIVE TYPE: ICD-10-CM

## 2023-02-03 DIAGNOSIS — N39.0 URINARY TRACT INFECTION WITHOUT HEMATURIA, SITE UNSPECIFIED: Primary | ICD-10-CM

## 2023-02-03 LAB
BILIRUB UR QL STRIP: NEGATIVE
GLUCOSE UR-MCNC: NEGATIVE MG/DL
KETONES P FAST UR STRIP-MCNC: NEGATIVE MG/DL
PH UR STRIP: 7 [PH] (ref 4.6–8)
PROT UR QL STRIP: NEGATIVE
SP GR UR STRIP: 1.01 (ref 1–1.03)
UA UROBILINOGEN AMB POC: NORMAL (ref 0.2–1)
URINALYSIS CLARITY POC: CLEAR
URINALYSIS COLOR POC: NORMAL
URINE BLOOD POC: NEGATIVE
URINE LEUKOCYTES POC: NORMAL
URINE NITRITES POC: NEGATIVE

## 2023-02-03 RX ORDER — DEXTROAMPHETAMINE SACCHARATE, AMPHETAMINE ASPARTATE, DEXTROAMPHETAMINE SULFATE AND AMPHETAMINE SULFATE 3.75; 3.75; 3.75; 3.75 MG/1; MG/1; MG/1; MG/1
15 TABLET ORAL DAILY
Qty: 30 TABLET | Refills: 0 | Status: SHIPPED | OUTPATIENT
Start: 2023-02-03

## 2023-02-03 NOTE — PROGRESS NOTES
Chief Complaint   Patient presents with    UTI     1. \"Have you been to the ER, urgent care clinic since your last visit? Hospitalized since your last visit? \" Yes Dr. Haven Dawson Visit - 1/19/23 and [facility]  1/21/23- Given Keflex    2. \"Have you seen or consulted any other health care providers outside of the [address] [place] [address] since your last visit? \" No     3. For patients aged 39-70: Has the patient had a colonoscopy / FIT/ Cologuard? Yes - no Care Gap present      If the patient is female:    4. For patients aged 41-77: Has the patient had a mammogram within the past 2 years? Yes - no Care Gap present      5. For patients aged 21-65: Has the patient had a pap smear?  Yes - no Care Gap present    Health Maintenance Due   Topic Date Due    Hepatitis C Screening  Never done    Shingles Vaccine (1 of 2) Never done    COVID-19 Vaccine (3 - Booster for Pfizer series) 04/22/2021    Flu Vaccine (1) 08/01/2022

## 2023-02-03 NOTE — PROGRESS NOTES
HISTORY OF PRESENT ILLNESS  Roxanna Mcclure is a 65 y.o. female. HPI Had had a fever since 1-15,no urgency, frequency. had a virtual visit on 1-19, suggested to have a urine specimen, eventually went to Urgent Care, -1-21, dxed with UTI, put on Cephalexin 500 mg bid, started feeling better after a few pills. Feels better now, finished antibiotics, and did a home urine specimen, which was positive for nitrites. No fever chills. No difficulty voiding, frequency. Drinking more water. No fever, chills at present. NO anorexia, back pains or stomach pains. 3 children are still at home. ROS    Physical Exam  Vitals and nursing note reviewed. Constitutional:       Appearance: She is well-developed. HENT:      Right Ear: External ear normal.      Left Ear: External ear normal.   Neck:      Thyroid: No thyromegaly. Cardiovascular:      Rate and Rhythm: Normal rate and regular rhythm. Heart sounds: Normal heart sounds. Pulmonary:      Breath sounds: Normal breath sounds. No wheezing. Abdominal:      General: Bowel sounds are normal. There is no distension. Palpations: Abdomen is soft. There is no mass. Tenderness: There is no abdominal tenderness. Lymphadenopathy:      Cervical: No cervical adenopathy. ASSESSMENT and PLAN  Orders Placed This Encounter    CULTURE, URINE    AMB POC URINALYSIS DIP STICK MANUAL W/O MICRO    dextroamphetamine-amphetamine (ADDERALL) 15 mg tablet     Diagnoses and all orders for this visit:    1. Urinary tract infection without hematuria, site unspecified  -     AMB POC URINALYSIS DIP STICK MANUAL W/O MICRO  -     CULTURE, URINE; Future    2. Attention deficit hyperactivity disorder (ADHD), predominantly inattentive type  -     dextroamphetamine-amphetamine (ADDERALL) 15 mg tablet; Take 1 Tablet by mouth daily. Max Daily Amount: 15 mg. If urine culture positive , will treat.

## 2023-02-04 LAB
BACTERIA SPEC CULT: NORMAL
SERVICE CMNT-IMP: NORMAL

## 2023-02-07 ENCOUNTER — TELEPHONE (OUTPATIENT)
Dept: FAMILY MEDICINE CLINIC | Age: 58
End: 2023-02-07

## 2023-02-07 NOTE — TELEPHONE ENCOUNTER
Per pharmacy, Amphet/Dextro is on backorder. Pharmacy is requesting an alternative. For Pharmacy Admin Tracking Only    Program: Medication Refill  CPA in place:   Recommendation Provided To:    Intervention Detail: New Rx: 1, reason: Needs Additional Therapy  Intervention Accepted By:   Gap Closed?:   Time Spent (min): 5

## 2023-02-08 NOTE — TELEPHONE ENCOUNTER
Patient notified adderall on back order. Patient may change to different medication if patient would like, if patient wants to change she is to contact the office and let pcp know. Otherwise let pharmacy know to contact her once shipment arrive.

## 2023-03-16 DIAGNOSIS — F90.0 ATTENTION DEFICIT HYPERACTIVITY DISORDER (ADHD), PREDOMINANTLY INATTENTIVE TYPE: ICD-10-CM

## 2023-03-17 RX ORDER — DEXTROAMPHETAMINE SACCHARATE, AMPHETAMINE ASPARTATE, DEXTROAMPHETAMINE SULFATE AND AMPHETAMINE SULFATE 3.75; 3.75; 3.75; 3.75 MG/1; MG/1; MG/1; MG/1
15 TABLET ORAL DAILY
Qty: 30 TABLET | Refills: 0 | Status: SHIPPED | OUTPATIENT
Start: 2023-03-17

## 2023-11-20 ENCOUNTER — NURSE TRIAGE (OUTPATIENT)
Dept: OTHER | Facility: CLINIC | Age: 58
End: 2023-11-20

## 2023-11-20 ENCOUNTER — OFFICE VISIT (OUTPATIENT)
Age: 58
End: 2023-11-20
Payer: COMMERCIAL

## 2023-11-20 VITALS
BODY MASS INDEX: 24.99 KG/M2 | OXYGEN SATURATION: 97 % | SYSTOLIC BLOOD PRESSURE: 125 MMHG | TEMPERATURE: 98 F | DIASTOLIC BLOOD PRESSURE: 60 MMHG | WEIGHT: 150 LBS | HEART RATE: 75 BPM | RESPIRATION RATE: 16 BRPM | HEIGHT: 65 IN

## 2023-11-20 DIAGNOSIS — R05.3 CHRONIC COUGH: Primary | ICD-10-CM

## 2023-11-20 PROCEDURE — G8427 DOCREV CUR MEDS BY ELIG CLIN: HCPCS | Performed by: FAMILY MEDICINE

## 2023-11-20 PROCEDURE — G8420 CALC BMI NORM PARAMETERS: HCPCS | Performed by: FAMILY MEDICINE

## 2023-11-20 PROCEDURE — 99213 OFFICE O/P EST LOW 20 MIN: CPT | Performed by: FAMILY MEDICINE

## 2023-11-20 PROCEDURE — 3017F COLORECTAL CA SCREEN DOC REV: CPT | Performed by: FAMILY MEDICINE

## 2023-11-20 PROCEDURE — G8484 FLU IMMUNIZE NO ADMIN: HCPCS | Performed by: FAMILY MEDICINE

## 2023-11-20 PROCEDURE — 4004F PT TOBACCO SCREEN RCVD TLK: CPT | Performed by: FAMILY MEDICINE

## 2023-11-20 RX ORDER — BENZONATATE 100 MG/1
100 CAPSULE ORAL 3 TIMES DAILY PRN
COMMUNITY
Start: 2023-10-15

## 2023-11-20 RX ORDER — METHYLPREDNISOLONE 4 MG/1
TABLET ORAL
Qty: 1 KIT | Refills: 0 | Status: SHIPPED | OUTPATIENT
Start: 2023-11-20 | End: 2023-11-26

## 2023-11-20 RX ORDER — LEVOFLOXACIN 500 MG/1
500 TABLET, FILM COATED ORAL DAILY
Qty: 7 TABLET | Refills: 0 | Status: SHIPPED | OUTPATIENT
Start: 2023-11-20 | End: 2023-11-27

## 2023-11-20 RX ORDER — DEXTROMETHORPHAN HYDROBROMIDE AND PROMETHAZINE HYDROCHLORIDE 15; 6.25 MG/5ML; MG/5ML
5 SYRUP ORAL 4 TIMES DAILY PRN
Qty: 240 ML | Refills: 1 | Status: SHIPPED | OUTPATIENT
Start: 2023-11-20 | End: 2023-12-14

## 2023-11-20 SDOH — ECONOMIC STABILITY: FOOD INSECURITY: WITHIN THE PAST 12 MONTHS, THE FOOD YOU BOUGHT JUST DIDN'T LAST AND YOU DIDN'T HAVE MONEY TO GET MORE.: NEVER TRUE

## 2023-11-20 SDOH — ECONOMIC STABILITY: INCOME INSECURITY: HOW HARD IS IT FOR YOU TO PAY FOR THE VERY BASICS LIKE FOOD, HOUSING, MEDICAL CARE, AND HEATING?: NOT HARD AT ALL

## 2023-11-20 SDOH — ECONOMIC STABILITY: HOUSING INSECURITY
IN THE LAST 12 MONTHS, WAS THERE A TIME WHEN YOU DID NOT HAVE A STEADY PLACE TO SLEEP OR SLEPT IN A SHELTER (INCLUDING NOW)?: NO

## 2023-11-20 SDOH — ECONOMIC STABILITY: FOOD INSECURITY: WITHIN THE PAST 12 MONTHS, YOU WORRIED THAT YOUR FOOD WOULD RUN OUT BEFORE YOU GOT MONEY TO BUY MORE.: NEVER TRUE

## 2023-11-20 NOTE — TELEPHONE ENCOUNTER
Location of patient: [facility] call from [place] at [facility] with [employer]. Subjective: Caller states \"\"     Current Symptoms: cough-productive unsure of color. Denies all other symptoms: runny nose, sore throat, SOB or, congestion    Onset: 5 weeks ago; worsening    Associated Symptoms:     Pain Severity: 0/10; Temperature: denies fevers     What has been tried: abx on 10/15-completed, cough drops    LMP: NA Pregnant: NA    Recommended disposition: See PCP within 3 Days    Care advice provided, patient verbalizes understanding; denies any other questions or concerns; instructed to call back for any new or worsening symptoms. Patient/Caller agrees with recommended disposition; writer provided warm transfer [facility] for appointment scheduling    Attention Provider: Thank you for allowing me to participate in the care of your patient. The patient was connected to triage in response to information provided to the ECC/PSC. Please do not respond through this encounter as the response is not directed to a shared pool.       Reason for Disposition   Cough has been present for > 3 weeks    Protocols used: Cough-ADULT-[place]

## 2023-11-20 NOTE — PROGRESS NOTES
Katheryn Hwang (:  1965) is a 63 y.o. female,Established patient, here for evaluation of the following chief complaint(s):  Cough         ASSESSMENT/PLAN:  1. Chronic cough  -     XR CHEST (2 VIEWS); Future  -     methylPREDNISolone (MEDROL DOSEPACK) 4 MG tablet; Take by mouth., Disp-1 kit, R-0Normal  -     promethazine-dextromethorphan (PROMETHAZINE-DM) 6.25-15 MG/5ML syrup; Take 5 mLs by mouth 4 times daily as needed for Cough, Disp-240 mL, R-1Normal  -     levoFLOXacin (LEVAQUIN) 500 MG tablet; Take 1 tablet by mouth daily for 7 days, Disp-7 tablet, R-0Normal  Pt also thinks that has a UTI- lab had gone home so couldn't do a urine culture. No follow-ups on file. Subjective   SUBJECTIVE/OBJECTIVE:  HPIcough for 6 weeks. Took ceftin 250 mg bid for 10 days. No fever, chills. Occ slight wheezing. No runny nose, postnasal drainage. Cough is occasionally productive. NO hx asthma. No problems with GERd. Nonsmoker, not on any ace inhibitors. 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 Also thinks that is getting a UTI    Review of Systems       Objective   Physical Exam  Cardiovascular:      Rate and Rhythm: Normal rate and regular rhythm. Heart sounds: Normal heart sounds. No murmur heard. Pulmonary:      Effort: Pulmonary effort is normal.      Comments: Rhonchi RLL  Abdominal:      General: Abdomen is flat. Bowel sounds are normal.      Palpations: Abdomen is soft. Musculoskeletal:      Right lower leg: No edema. Left lower leg: No edema. An electronic signature was used to authenticate this note.     --Anushka Morillo MD

## 2023-11-20 NOTE — PROGRESS NOTES
Chief Complaint   Patient presents with    Cough         1. \"Have you been to the ER, urgent care clinic since your last visit? Hospitalized since your last visit? \" [facility] - 5 weeks ago    2. \"Have you seen or consulted any other health care providers outside of the [address] [place] [address] since your last visit? \" no     3. For patients aged 43-73: Has the patient had a colonoscopy / FIT/ Cologuard? N/a      If the patient is female:    4. For patients aged 43-66: Has the patient had a mammogram within the past 2 years? N/a      5. For patients aged 21-65: Has the patient had a pap smear?  N/a      Health Maintenance Due   Topic Date Due    Hepatitis B vaccine (1 of 3 - 3-dose series) Never done    HIV screen  Never done    Hepatitis C screen  Never done    DTaP/Tdap/Td vaccine (1 - Tdap) Never done    Shingles vaccine (1 of 2) Never done    Flu vaccine (1) 08/01/2023    A1C test (Diabetic or Prediabetic)  08/24/2023    COVID-19 Vaccine (3 - 2023-24 season) 09/01/2023